# Patient Record
Sex: MALE | Race: WHITE | NOT HISPANIC OR LATINO | Employment: OTHER | URBAN - METROPOLITAN AREA
[De-identification: names, ages, dates, MRNs, and addresses within clinical notes are randomized per-mention and may not be internally consistent; named-entity substitution may affect disease eponyms.]

---

## 2019-08-26 ENCOUNTER — OFFICE VISIT (OUTPATIENT)
Dept: NEUROSURGERY | Facility: CLINIC | Age: 49
End: 2019-08-26

## 2019-08-26 ENCOUNTER — HOSPITAL ENCOUNTER (OUTPATIENT)
Dept: GENERAL RADIOLOGY | Facility: HOSPITAL | Age: 49
Discharge: HOME | End: 2019-08-26
Admitting: NEUROLOGICAL SURGERY

## 2019-08-26 VITALS — WEIGHT: 252 LBS | HEIGHT: 71 IN | BODY MASS INDEX: 35.28 KG/M2 | RESPIRATION RATE: 16 BRPM

## 2019-08-26 DIAGNOSIS — M48.02 SPINAL STENOSIS IN CERVICAL REGION: ICD-10-CM

## 2019-08-26 DIAGNOSIS — M50.30 DDD (DEGENERATIVE DISC DISEASE), CERVICAL: ICD-10-CM

## 2019-08-26 DIAGNOSIS — S13.4XXA WHIPLASH INJURY TO NECK, INITIAL ENCOUNTER: Primary | ICD-10-CM

## 2019-08-26 DIAGNOSIS — S13.4XXA WHIPLASH INJURY TO NECK, INITIAL ENCOUNTER: ICD-10-CM

## 2019-08-26 PROCEDURE — 99203 OFFICE O/P NEW LOW 30 MIN: CPT | Performed by: NEUROLOGICAL SURGERY

## 2019-08-26 PROCEDURE — 72040 X-RAY EXAM NECK SPINE 2-3 VW: CPT

## 2019-08-26 RX ORDER — LISINOPRIL 20 MG/1
TABLET ORAL
Refills: 0 | COMMUNITY
Start: 2019-07-11

## 2019-08-26 RX ORDER — CLOPIDOGREL BISULFATE 75 MG/1
TABLET ORAL
Refills: 0 | COMMUNITY
Start: 2019-06-25

## 2019-08-26 RX ORDER — ATORVASTATIN CALCIUM 80 MG/1
TABLET, FILM COATED ORAL
Refills: 7 | COMMUNITY
Start: 2019-08-20

## 2019-08-26 NOTE — PROGRESS NOTES
NAME: JONAS GAMEZ   DOS: 2019  : 1970  PCP: Harry Osorio MD    Chief Complaint:    Chief Complaint   Patient presents with   • Neck Pain       History of Present Illness:  49 y.o. 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  He was in his usual state of health reports no neck or low back issues and then on  had a rollover tractor event secondary to what he reports is a recall issue on the tractor.  He denies any acute issues but he has electrical pains that went down his right arm into his hand he denies any residual symptomatology other than paraspinal neck pain and shoulder pain    He is been to a chiropractor he is been seen by an orthopedic surgeon who recommended that he not get surgery he had some minor rotator cuff tears he denies any bowel bladder incontinence issues symptoms of cervical myelopathy and is here for evaluation  PMHX  Allergies:  Allergies   Allergen Reactions   • Amoxicillin Rash     Medications    Current Outpatient Medications:   •  atorvastatin (LIPITOR) 80 MG tablet, , Disp: , Rfl: 7  •  clopidogrel (PLAVIX) 75 MG tablet, , Disp: , Rfl: 0  •  lisinopril (PRINIVIL,ZESTRIL) 20 MG tablet, , Disp: , Rfl: 0  Past Medical History:  Past Medical History:   Diagnosis Date   • Arthritis    • Headache    • Heart attack (CMS/HCC) 2017    [facility]   • Hypertension    • Low back pain      Past Surgical History:  Past Surgical History:   Procedure Laterality Date   • SPINAL CORD STIMULATOR REMOVAL      Implant/Removal -RENAN Mckeon      Social Hx:  Social History     Tobacco Use   • Smoking status: Current Every Day Smoker   • Smokeless tobacco: Never Used   Substance Use Topics   • Alcohol use: No     Frequency: Never   • Drug use: No     Family Hx:  Family History   Problem Relation Age of Onset   • Heart disease Father    • Cancer Sister         Breast  Cancer     Review of Systems:        Review of Systems   Constitutional: Negative for activity change, appetite change, chills, diaphoresis, fatigue, fever and unexpected weight change.   HENT: Negative for congestion, dental problem, drooling, ear discharge, ear pain, facial swelling, hearing loss, mouth sores, nosebleeds, postnasal drip, rhinorrhea, sinus pressure, sneezing, sore throat, tinnitus, trouble swallowing and voice change.    Eyes: Negative for photophobia, pain, discharge, redness, itching and visual disturbance.   Respiratory: Negative for apnea, cough, choking, chest tightness, shortness of breath, wheezing and stridor.    Cardiovascular: Negative for chest pain, palpitations and leg swelling.   Gastrointestinal: Negative for abdominal distention, abdominal pain, anal bleeding, blood in stool, constipation, diarrhea, nausea, rectal pain and vomiting.   Endocrine: Negative for cold intolerance, heat intolerance, polydipsia, polyphagia and polyuria.   Genitourinary: Negative for decreased urine volume, difficulty urinating, dysuria, enuresis, flank pain, frequency, genital sores, hematuria and urgency.   Musculoskeletal: Positive for neck pain. Negative for arthralgias, back pain, gait problem, joint swelling, myalgias and neck stiffness.   Skin: Negative for color change, pallor, rash and wound.   Allergic/Immunologic: Negative for environmental allergies, food allergies and immunocompromised state.   Neurological: Negative for dizziness, tremors, seizures, syncope, facial asymmetry, speech difficulty, weakness, light-headedness, numbness and headaches.   Hematological: Negative for adenopathy. Does not bruise/bleed easily.   Psychiatric/Behavioral: Negative for agitation, behavioral problems, confusion, decreased concentration, dysphoric mood, hallucinations, self-injury, sleep disturbance and suicidal ideas. The patient is not nervous/anxious and is not hyperactive.       I have reviewed this note  template and all pertinent parts of the review of systems social, family history, surgical history and medication list  He does occasionally have vertigo by his reports    Physical Examination:  Vitals:    08/26/19 1002   Resp: 16      General Appearance:   Well developed, well nourished, well groomed, alert, and cooperative.  Neurological examination:  Neurologic Exam  Vital signs were reviewed and documented in the chart  Patient appeared in good neurologic function with normal comprehension fluent speech  Mood and affect are normal  Sense of smell deferred    Pupils symmetric equally reactive funduscopic exam not visualized   Visual fields intact to confrontation  Extraocular movements intact  Face motor function is symmetric  Facial sensations normal  Hearing intact to finger rub hearing intact to finger rub  Tongue is midline  Palate symmetric  Swallowing normal  Shoulder shrug normal  Limited range of motion in the neck especially on the right return he has no winging of the scapula   His heart is regular rate and rhythm  He is got no evidence of cervical carotid bruits he has no Kae's  Muscle bulk and tone normal  5 out of 5 strength no motor drift, I will note that he is effort dependent on the right secondary to probably pain  Gait normal intact  Negative Romberg  No clonus long tract signs or myelopathy    Reflexes symmetric  1+ at the ankles edema noted and extremities skin appears normal    Straight leg raise sign absent  No signs of intrinsic hip dysfunction  Back is without any lesions or abnormality  Feet are warm and well perfused        Review of Imaging/DATA:  I reviewed an MRI of the cervical spine as well as the reports personally is yet some degenerative changes there is nothing surgical mostly at the C3-4 area there is no evidence of compression he does have facet arthropathy  Diagnoses/Plan:    Mr. Clark is a 49 y.o. male   He had cervical trauma with perhaps either a traction injury  and/or musculoskeletal tears to the shoulder and right sided neck area he has no evidence of radiculopathy or anything that I can do surgery on.  I think is going to require ongoing treatment for this as far as pain management it has been since February and I warned him that this may become a chronic condition for him but it is difficult to ascertain.  I recommended continued nonsteroidal anti-inflammatory usage.  I made him a referral for physical therapy I would withhold for full manipulation of the neck via his chiropractor and hopefully some facet injections Botox injections etc. along with some therapy will get him back to where he needs to be.    He continues to work he does have a pending settlement with a [employer] company regarding the recall on this and has not filed this under Worker's Comp.    I explained the signs and symptoms to look for to necessitate referral back I am and check a flexion-extension film today just to ensure he has no evidence of listhesis I think that is low risk given his neurologically intact state    He is to call me if he wishes to be seen again